# Patient Record
Sex: FEMALE | Race: WHITE
[De-identification: names, ages, dates, MRNs, and addresses within clinical notes are randomized per-mention and may not be internally consistent; named-entity substitution may affect disease eponyms.]

---

## 2018-02-18 ENCOUNTER — HOSPITAL ENCOUNTER (EMERGENCY)
Dept: HOSPITAL 45 - C.EDB | Age: 23
Discharge: HOME | End: 2018-02-18
Payer: COMMERCIAL

## 2018-02-18 VITALS — DIASTOLIC BLOOD PRESSURE: 67 MMHG | TEMPERATURE: 98.6 F | SYSTOLIC BLOOD PRESSURE: 99 MMHG

## 2018-02-18 VITALS
WEIGHT: 110.01 LBS | HEIGHT: 62.99 IN | HEIGHT: 62.99 IN | WEIGHT: 110.01 LBS | BODY MASS INDEX: 19.49 KG/M2 | BODY MASS INDEX: 19.49 KG/M2

## 2018-02-18 VITALS — HEART RATE: 73 BPM | OXYGEN SATURATION: 100 %

## 2018-02-18 DIAGNOSIS — Z79.3: ICD-10-CM

## 2018-02-18 DIAGNOSIS — M25.562: Primary | ICD-10-CM
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
## 2018-02-18 NOTE — DIAGNOSTIC IMAGING REPORT
L KNEE 1 OR 2 VIEWS ROUTINE



CLINICAL HISTORY: Left knee pain.    



COMPARISON: None



FINDINGS:  Alignment of the left knee is anatomic. There is no joint effusion.

No fracture or osseous lesion is identified.



IMPRESSION: Unremarkable left knee radiographs.







Electronically signed by:  Bro Banuelos M.D.

2/18/2018 10:49 AM



Dictated Date/Time:  2/18/2018 10:49 AM

## 2024-02-26 NOTE — EMERGENCY ROOM VISIT NOTE
History


Report prepared by Martha:  Jesse Mcnulty


Under the Supervision of:  Dr. Wisam Land M.D.


First contact with patient:  10:34


Chief Complaint:  KNEEPAIN


Stated Complaint:  HURT MY KNEE





History of Present Illness


The patient is a 22 year old female who presents to the Emergency Room with 

complaints of a sudden left knee injury that occurred 2 nights ago. She says 

that while at work, she was walking and went to turn, and fell to the ground 

because it seemed like her left knee came out. The patient states that she felt 

a pop. She says that the pain is not that bad right now, and denies any 

swelling. She does not want her parents called.





   Source of History:  patient


   Onset:  2 nights ago


   Position:  knee (left)


   Symptom Intensity:  pain not that bad


   Quality:  other (no swelling)


   Timing:  other (sudden)


Note:


Associated symptoms: Left knee pain. Denies swelling.





Review of Systems


See HPI for pertinent positives & negatives. A total of 10 systems reviewed and 

were otherwise negative.





Past Medical & Surgical


Medical Problems:


(1) No chronic diseases present








Family History





No pertinent family history





Social History


Smoking Status:  Never Smoker


Marital Status:  single


Housing Status:  lives with roommate


Occupation Status:  employed, student





Current/Historical Medications


Scheduled


Birth Control Pills (Birth Control Pills), 1 TAB PO DAILY





Allergies


Coded Allergies:  


     No Known Allergies (Unverified , 2/18/16)





Physical Exam


Vital Signs











  Date Time  Temp Pulse Resp B/P (MAP) Pulse Ox O2 Delivery O2 Flow Rate FiO2


 


2/18/18 11:07  73 18  100   


 


2/18/18 10:09 37.0 99 18 99/67 97 Room Air  











Physical Exam


GENERAL: Patient is a healthy-appearing well-nourished 22 year old female.


HEAD: Normocephalic atraumatic


EYES: Ocular movements intact pupils equal and react to light


OROPHARYNX mucous membranes are moist no exudates present no erythema or edema 

present


NECK: Supple no nuchal rigidity


CHEST: Good equal expansion


LUNGS: Clear and equal to auscultation


CARDIAC: Normal S1 and S2


ABDOMEN: Soft nontender no guarding


BACK: No CVA tenderness


EXTREMITIES: Left knee has good range of motion. No pain with varus or valgus 

stress. Lax patella.


NEURO: Patient is following commands and answering questions appropriately. 

Alert and oriented x3 Cranial Nerves 2-12 grossly intact





Medical Decision & Procedures


ER Provider


Diagnostic Interpretation:


X-ray results as stated below per interpretation by me and the radiologist: 











L KNEE 1 OR 2 VIEWS ROUTINE





CLINICAL HISTORY: Left knee pain.    





COMPARISON: None





FINDINGS:  Alignment of the left knee is anatomic. There is no joint effusion.


No fracture or osseous lesion is identified.





IMPRESSION: Unremarkable left knee radiographs.








Electronically signed by:  Bro Banuelos M.D.


2/18/2018 10:49 AM





Dictated Date/Time:  2/18/2018 10:49 AM





ED Course


1033: Past medical records reviewed. The patient was evaluated in room B12B. A 

complete history and physical examination was performed. 





1054: Upon reexamination the patient is resting comfortably. I discussed 

results and treatment plan with the patient. She verbalizes agreement and 

understanding. The patient is ready for discharge.





Medical Decision


Differential diagnosis:


Etiologies such as fracture, dislocation, intra-abdominal, pneumothorax, 

intrathoracic , intracranial, neurologic, as well as other traumatic 

pathologies were entertained.





This is a 22-year-old female who presents emergency department complaining of 

laxity to the left patella.  I suspect that the patient dislocated this 

temporarily and then it was relocated.  I offered to call this patient's 

parents to speak directly with them however she refused.  She was sent for an x-

ray which did not show any evidence of fracture dislocation or subluxation.  I 

strongly recommended that the patient receive crutches as well as a knee 

immobilizer however she refused.  I strongly recommended that the patient follow

-up with orthopedics.  Patient was in agreement with the treatment plan.





Medication Reconcilliation


Current Medication List:  was personally reviewed by me





Blood Pressure Screening


Patient's blood pressure:  Normal blood pressure





Impression





 Primary Impression:  


 Knee pain, left





Scribe Attestation


The scribe's documentation has been prepared under my direction and personally 

reviewed by me in its entirety. I confirm that the note above accurately 

reflects all work, treatment, procedures, and medical decision making performed 

by me.





Departure Information


Dispostion


Home / Self-Care





Referrals


[facility] (PCP)








Timur Frank MD





Patient Instructions


ED Dislocation Patella, Kneecap, Kneecap Patella Probs Evaluate, Kneecap Probs  

Common, Kneecap Probs Rehab, Kneecap and Knee Joint, My [facility]





Additional Instructions





Take 600 mg Ibuprofen every 6 hours











You have been examined and treated today on an emergency basis only. This is 

not a substitute for, or an effort to provide, complete comprehensive medical 

care. It is impossible to recognize and treat all injuries or illnesses in a 

single emergency department visit. It is therefore important that you follow up 

closely with [facility].  Call as soon as possible for an 

appointment.  





Thank you for your time and consideration.  I look forward to speaking with you 

again soon.  Please don't hesitate to call us if you have any questions.





Problem Qualifiers








 Primary Impression:  


 Knee pain, left


 Chronicity:  acute  Qualified Codes:  M25.562 - Pain in left knee
No